# Patient Record
Sex: FEMALE | Race: WHITE | NOT HISPANIC OR LATINO | URBAN - METROPOLITAN AREA
[De-identification: names, ages, dates, MRNs, and addresses within clinical notes are randomized per-mention and may not be internally consistent; named-entity substitution may affect disease eponyms.]

---

## 2023-03-08 ENCOUNTER — TELEPHONE ENCOUNTER (OUTPATIENT)
Dept: URBAN - METROPOLITAN AREA CLINIC 7 | Facility: CLINIC | Age: 83
End: 2023-03-08

## 2023-03-23 ENCOUNTER — WEB ENCOUNTER (OUTPATIENT)
Dept: URBAN - METROPOLITAN AREA CLINIC 7 | Facility: CLINIC | Age: 83
End: 2023-03-23

## 2023-03-28 ENCOUNTER — DASHBOARD ENCOUNTERS (OUTPATIENT)
Age: 83
End: 2023-03-28

## 2023-03-28 ENCOUNTER — OFFICE VISIT (OUTPATIENT)
Dept: URBAN - METROPOLITAN AREA CLINIC 7 | Facility: CLINIC | Age: 83
End: 2023-03-28
Payer: MEDICARE

## 2023-03-28 ENCOUNTER — WEB ENCOUNTER (OUTPATIENT)
Dept: URBAN - METROPOLITAN AREA CLINIC 7 | Facility: CLINIC | Age: 83
End: 2023-03-28

## 2023-03-28 VITALS
TEMPERATURE: 97.7 F | WEIGHT: 205 LBS | BODY MASS INDEX: 31.07 KG/M2 | SYSTOLIC BLOOD PRESSURE: 122 MMHG | DIASTOLIC BLOOD PRESSURE: 60 MMHG | HEIGHT: 68 IN

## 2023-03-28 DIAGNOSIS — K92.2 GASTROINTESTINAL HEMORRHAGE, UNSPECIFIED GASTROINTESTINAL HEMORRHAGE TYPE: ICD-10-CM

## 2023-03-28 DIAGNOSIS — R19.5 LOOSE STOOLS: ICD-10-CM

## 2023-03-28 DIAGNOSIS — D64.9 ANEMIA, UNSPECIFIED TYPE: ICD-10-CM

## 2023-03-28 DIAGNOSIS — I25.10 CORONARY ARTERY DISEASE INVOLVING NATIVE HEART, UNSPECIFIED VESSEL OR LESION TYPE, UNSPECIFIED WHETHER ANGINA PRESENT: ICD-10-CM

## 2023-03-28 PROBLEM — 53741008: Status: ACTIVE | Noted: 2023-03-28

## 2023-03-28 PROCEDURE — 99204 OFFICE O/P NEW MOD 45 MIN: CPT | Performed by: STUDENT IN AN ORGANIZED HEALTH CARE EDUCATION/TRAINING PROGRAM

## 2023-03-28 PROCEDURE — 99244 OFF/OP CNSLTJ NEW/EST MOD 40: CPT | Performed by: STUDENT IN AN ORGANIZED HEALTH CARE EDUCATION/TRAINING PROGRAM

## 2023-03-28 RX ORDER — LISINOPRIL 20 MG/1
1 TABLET TABLET ORAL ONCE A DAY
Qty: 30 | Status: ACTIVE | COMMUNITY
Start: 2023-03-28

## 2023-03-28 RX ORDER — PRAVASTATIN SODIUM 20 MG/1
1 TABLET TABLET ORAL ONCE A DAY
Qty: 30 | Status: ACTIVE | COMMUNITY
Start: 2023-03-28

## 2023-03-28 RX ORDER — GLIPIZIDE 5 MG/1
1 TABLET 30 MINUTES BEFORE BREAKFAST TABLET ORAL ONCE A DAY
Qty: 30 | Status: ACTIVE | COMMUNITY
Start: 2023-03-28

## 2023-03-28 RX ORDER — ZOLPIDEM TARTRATE 10 MG/1
TAKE 1 TABLET BY MOUTH AT BEDTIME ONCE DAILY TABLET, FILM COATED ORAL
Qty: 90 EACH | Refills: 0 | Status: ON HOLD | COMMUNITY

## 2023-03-28 RX ORDER — CLOPIDOGREL BISULFATE 75 MG/1
TAKE 1 TABLET BY MOUTH ONCE DAILY TABLET, FILM COATED ORAL
Qty: 90 EACH | Refills: 0 | Status: ACTIVE | COMMUNITY

## 2023-03-28 RX ORDER — PANTOPRAZOLE SODIUM 40 MG/1
1 TABLET TABLET, DELAYED RELEASE ORAL ONCE A DAY
Qty: 30 | Status: ACTIVE | COMMUNITY
Start: 2023-03-28

## 2023-03-28 RX ORDER — LORATADINE 10 MG
1 PACKET MIXED WITH 8 OUNCES OF FLUID TABLET,DISINTEGRATING ORAL ONCE A DAY
Qty: 30 | Status: ACTIVE | COMMUNITY
Start: 2023-03-28 | End: 2023-04-27

## 2023-03-28 RX ORDER — PREDNISONE 20 MG/1
TAKE 1 TABLET BY MOUTH ONCE DAILY FOR 5 DAYS TABLET ORAL
Qty: 5 EACH | Refills: 0 | Status: ON HOLD | COMMUNITY

## 2023-03-28 RX ORDER — EMPAGLIFLOZIN 25 MG/1
TABLET, FILM COATED ORAL
Qty: 90 TABLET | Status: ACTIVE | COMMUNITY

## 2023-03-28 RX ORDER — KRILL/OM-3/DHA/EPA/PHOSPHO/AST 1000-230MG
1 TABLET CAPSULE ORAL ONCE A DAY
Qty: 30 | Status: ACTIVE | COMMUNITY
Start: 2023-03-28 | End: 2023-04-27

## 2023-03-28 RX ORDER — FENOFIBRATE 145 MG/1
1 TABLET TABLET, FILM COATED ORAL ONCE A DAY
Qty: 30 | Status: ACTIVE | COMMUNITY
Start: 2023-03-28

## 2023-03-28 NOTE — HPI-TODAY'S VISIT:
Patient has a history of HTN, DM, HLD, hypothyroidism, hx of GI bleed, CAD s/p stent x1 in 5/2021 on plavix. who presents for hospital follow up  Cardiology: Dr. Rufino Arreola  GI Hx: ER 2/1/2023- dark stools, weakness. +FOBT. Hgb 8.1, BUN 77, creat 2.13. First trop elevated. CT Abd normal. NM tagged normal. 3/2023- Unsure why he is on plavix/blood thinner. Since discharged. No recurrent dark stools/melena. 3-4 days of diarrhea (1 BM/day), has gas/bloating.  Denies weight loss, fever, chills, abdominal pain, nausea, vomiting. Denies dysphagia, reflux, UGI symptoms. Denies hematemesis.  EGD: 2/2023- [facility]- duodenitis, multioke duodenal erosions, gastritis, gastric polyp.  Colonoscopy: 10 y/a- polyps (large one?) No records available.  Imaging/Studies/Procedures: